# Patient Record
(demographics unavailable — no encounter records)

---

## 2024-11-15 NOTE — HISTORY OF PRESENT ILLNESS
[FreeTextEntry1] : 5/23 right partial mastectomy  right 12:00, calcifications, 10 mm, ER/UT positive, intermediate grade DCIS, negative margins TisNXM0, Stage 0 No XRT; Anastrazole x 1-year; Tamoxifen  Mother with breast cancer at age 70  Patient denies any breast masses or nipple discharge.  Patient will be getting a D&C soon from her gynecologist because of a thickened endometrium.  71-year-old postmenopausal nulliparous woman with a family history of breast cancer presents after screening mammogram showed some upper outer quadrant right breast calcifications at approximately 12:00, BI-RADS 4.  Patient denied any breast masses or nipple discharge.  Patient underwent a right stereotactic core biopsy which revealed intermediate grade ER/UT positive ductal carcinoma in situ.  Patient has never had a breast biopsy and has never had genetic testing although her mother had breast cancer at age 70.  Patient has had a long history of alcohol and narcotic abuse that she no longer does but is a long-term 1 pack/day smoker greater than 20 years.

## 2024-11-15 NOTE — REVIEW OF SYSTEMS
[Negative] : Heme/Lymph [Nasal Discharge] : nasal discharge [Breast Lump] : breast lump [de-identified] : New Moles

## 2024-11-15 NOTE — PAST MEDICAL HISTORY
[Postmenopausal] : The patient is postmenopausal [Menarche Age ____] : age at menarche was [unfilled] [History of Hormone Replacement Treatment] : has no history of hormone replacement treatment [Definite ___ (Date)] : the last menstrual period was [unfilled] [Total Preg ___] : G[unfilled]

## 2025-02-03 NOTE — ASSESSMENT
[FreeTextEntry1] : #DCIS - ER/WV+ - We have reviewed the diagnosis, prognosis and natural history of DCIS. - We have reviewed the imaging and pathology reports personally and discussed the findings with the patient. - We have discussed that she has already had a lumpectomy which is usually followed by radiation given the findings of DCIS. - She does not want to receive radiation. -  Reviewed bone density - osteopenia of hip - Has not started on Anastrozole. Advised that she should - 12/20/23 vs and CBC reviewed; WBC 4.16, hgb 13.1, plt 177. She started Anastrazole overall tolerating but with some changes to mood. Has depression at baseline on Buspirone. +hair thinning. s/p follow up with Dr. Vargas 10/2023, note reviewed. Mammo/sono due 3/2024.  - 4/29/24 vs and CBC reviewed; Continue Anastrazole. Has follow up with dr. Vargas 5/2024 and mammo/sono same day. +hair thinning using rogaine. Advised to avoid anything with synthetic hormones  8/5/24 - vs and labs reviewed. labs drawn in office today. 4.14, hgb 11.8, plts wnl. 5/3/24 - No mammographic or sonographic evidence of malignancy. RECOMMENDATION:  Mammography in 1 year. She is having hotflashes and hair thinning. She wants to switch from anastrozole and change to tamoxifen - Discussed side effects and risk of blood clots franko with her smoking history. recommend ASA 81 mg. follow up with Dr. Vargas   #Macrocytosis resolved  #Monoclonal Gammopathy  - IgG Kappa - M spike - unable to quantify - K:L ratio 2.08 - Pending skeletal survey - 12/20/23 patient has not gone for skeletal survey. Again reminded to schedule this. Repeat MGUS labs today and monitor. No anemia. Ca++ and Cr wnl  - 4/29/24 hgb 12 today. Repeat MGUS labs. Have been stable. Did not go for skeletal survey. Again reminded.  -8/5/24 - skeletal survey neg, M spike unable to quantitate  #Tobacco Abuse  - Advised cessation - She does not want to stop at this time - 12/20/23 she is still smoking and has plan to quit after the holidays. She has nicotine patches and gum at home. Discussed wellness center. Encouraged smoking cessation  - 4/29/24 still smoking encuraged cessation  8/5/24 - encourage cessation  #Osteopenia - s/p DEXA 9/2023 revealing L spine 0.4, L femoral neck -0.7, L hip -1.2 - Continue Ca++ and vit D  - Weight bearing exercises  - Continue to monitor q2y  - Maintain healthy BMI and limit ETOH   #Leukopenia  4.14 - monitor  #Vit D deficiency  - Monitor levels. Repeat today  - Continue vit D   RTC in 6months with cbc with diff,cmp, myeloma labs and myeloma urines vit D

## 2025-02-03 NOTE — HISTORY OF PRESENT ILLNESS
[de-identified] : Ms. Harmon is a 71-year-old postmenopausal nulliparous woman with a family history of breast cancer presents for newly diagnosed DCIS.   Oncological History:   3/2023 s/p screening mammogram  revealing grouping of calcifications in the right breast. The patient will be recalled for further evaluation with additional mammographic views of the right breast. BIRADS 0   3/29/23 s/p diagnostic mammogram revealing suspicious calcifications at the right 12:00 axis. Stereotactic biopsy is recommended. BIRADS 4   23 s/p right stereotactic core biopsy which revealed intermediate grade ER/NM positive ductal carcinoma in situ.    23 s/p R lumpectomy pathology revealing right 12:00, calcifications, 10 mm, ER/NM positive, intermediate grade DCIS, negative margins  Patient has had a long history of alcohol and narcotic abuse that she no longer does but is a long-term 1 pack/day smoker greater than 20 years referred to pul for lung cancer screening.   Breast Cancer Risk Factors: Menarche: 11 Menopause: 52  OCP: years HRT: denies Related family history Mother with breast cancer at age 70,  at 95 not from breast cancer BRCA testing: no     [de-identified] : Patient seen and examined today for routine follow up for the management of DCIS.  complains of hotflashes and hair thinning.  She is on Lexapro.  She is still smoking has not tried nicotine patches. Using Rogaine for hair thinning.  Continues to follow with dr. Vargas and has mammo same day 5/2024.  skeletal survey neg

## 2025-02-20 NOTE — HISTORY OF PRESENT ILLNESS
[de-identified] : Ms. Harmon is a 71-year-old postmenopausal nulliparous woman with a family history of breast cancer presents for newly diagnosed DCIS.   Oncological History:   3/2023 s/p screening mammogram  revealing grouping of calcifications in the right breast. The patient will be recalled for further evaluation with additional mammographic views of the right breast. BIRADS 0   3/29/23 s/p diagnostic mammogram revealing suspicious calcifications at the right 12:00 axis. Stereotactic biopsy is recommended. BIRADS 4   23 s/p right stereotactic core biopsy which revealed intermediate grade ER/NV positive ductal carcinoma in situ.    23 s/p R lumpectomy pathology revealing right 12:00, calcifications, 10 mm, ER/NV positive, intermediate grade DCIS, negative margins  Patient has had a long history of alcohol and narcotic abuse that she no longer does but is a long-term 1 pack/day smoker greater than 20 years referred to pul for lung cancer screening.   Breast Cancer Risk Factors: Menarche: 11 Menopause: 52  OCP: years HRT: denies Related family history Mother with breast cancer at age 70,  at 95 not from breast cancer BRCA testing: no     [de-identified] : Patient seen and examined today for routine follow up for the management of DCIS. Due to hot flashes and hair thinning she was switched to tamoxifen states hot flashes are about the same. She is tolerating and not unbearable right now. Dr. Verma recently started her on bupropion, has interaction with tamoxifen. Still smoking. Did LDCT benign. continues to follow with Dr. Vargas. s/p mammo/sono 5/2024 due 5/2025.

## 2025-02-20 NOTE — ASSESSMENT
[FreeTextEntry1] : #DCIS - ER/CO+ - We have reviewed the diagnosis, prognosis and natural history of DCIS. - We have reviewed the imaging and pathology reports personally and discussed the findings with the patient. - We have discussed that she has already had a lumpectomy which is usually followed by radiation given the findings of DCIS. - She does not want to receive radiation. -  Reviewed bone density - osteopenia of hip - Has not started on Anastrozole. Advised that she should - 12/20/23 vs and CBC reviewed; WBC 4.16, hgb 13.1, plt 177. She started Anastrazole overall tolerating but with some changes to mood. Has depression at baseline on Buspirone. +hair thinning. s/p follow up with Dr. Vargas 10/2023, note reviewed. Mammo/sono due 3/2024.  - 4/29/24 vs and CBC reviewed; Continue Anastrazole. Has follow up with dr. Vargas 5/2024 and mammo/sono same day. +hair thinning using rogaine. Advised to avoid anything with synthetic hormones  8/5/24 - vs and labs reviewed. labs drawn in office today. 4.14, hgb 11.8, plts wnl. 5/3/24 - No mammographic or sonographic evidence of malignancy. RECOMMENDATION:  Mammography in 1 year. She is having hotflashes and hair thinning. She wants to switch from anastrozole and change to tamoxifen - Discussed side effects and risk of blood clots franko with her smoking history. recommend ASA 81 mg. follow up with Dr. Vargas 2/2025 vs and labs reviewed; continue tamoxifen and ASA 81mg. She states that hotflashes are the same they are not unbearable. She is already on lexapro does not want futher medication. Of note i reviewed the interaction with buproprion as discussed with tabitha, she will discuss with Dr. Verma who prescribed. mammo/sono 5/2025   #Monoclonal Gammopathy  - IgG Kappa - M spike - unable to quantify - K:L ratio 2.08 - Pending skeletal survey - 12/20/23 patient has not gone for skeletal survey. Again reminded to schedule this. Repeat MGUS labs today and monitor. No anemia. Ca++ and Cr wnl  - 4/29/24 hgb 12 today. Repeat MGUS labs. Have been stable. Did not go for skeletal survey. Again reminded.  -8/5/24 - skeletal survey neg, M spike unable to quantitate - 2/2025 repeat levels today   #Tobacco Abuse  - Advised cessation - She does not want to stop at this time - 12/20/23 she is still smoking and has plan to quit after the holidays. She has nicotine patches and gum at home. Discussed wellness center. Encouraged smoking cessation  - 4/29/24 still smoking encouraged cessation  8/5/24 - encourage cessation 2/2025 gave info to smoking cessation   #Osteopenia - s/p DEXA 9/2023 revealing L spine 0.4, L femoral neck -0.7, L hip -1.2 - Continue Ca++ and vit D  - Weight bearing exercises  - Continue to monitor q2y due 9/2025  - Maintain healthy BMI and limit ETOH   #Leukopenia  - Monitor   #Vit D deficiency  - Monitor levels. Repeat today  - Continue vit D   #Chronic Lower ext edema  - For many years  - Wears compression stockings, L worse than R, ext warm. No redness +vascular changes, no pain. On baby ASA  - Advised to call if worsening   RTC in 6months with cbc with diff,cmp, myeloma labs and myeloma urines vit D   Case and management dicsussed with Ave Hernandez

## 2025-02-20 NOTE — ASSESSMENT
[FreeTextEntry1] : #DCIS - ER/MI+ - We have reviewed the diagnosis, prognosis and natural history of DCIS. - We have reviewed the imaging and pathology reports personally and discussed the findings with the patient. - We have discussed that she has already had a lumpectomy which is usually followed by radiation given the findings of DCIS. - She does not want to receive radiation. -  Reviewed bone density - osteopenia of hip - Has not started on Anastrozole. Advised that she should - 12/20/23 vs and CBC reviewed; WBC 4.16, hgb 13.1, plt 177. She started Anastrazole overall tolerating but with some changes to mood. Has depression at baseline on Buspirone. +hair thinning. s/p follow up with Dr. Vargas 10/2023, note reviewed. Mammo/sono due 3/2024.  - 4/29/24 vs and CBC reviewed; Continue Anastrazole. Has follow up with dr. Vargas 5/2024 and mammo/sono same day. +hair thinning using rogaine. Advised to avoid anything with synthetic hormones  8/5/24 - vs and labs reviewed. labs drawn in office today. 4.14, hgb 11.8, plts wnl. 5/3/24 - No mammographic or sonographic evidence of malignancy. RECOMMENDATION:  Mammography in 1 year. She is having hotflashes and hair thinning. She wants to switch from anastrozole and change to tamoxifen - Discussed side effects and risk of blood clots franko with her smoking history. recommend ASA 81 mg. follow up with Dr. Vargas 2/2025 vs and labs reviewed; continue tamoxifen and ASA 81mg. She states that hotflashes are the same they are not unbearable. She is already on lexapro does not want futher medication. Of note i reviewed the interaction with buproprion as discussed with tabitha, she will discuss with Dr. Verma who prescribed. mammo/sono 5/2025   #Monoclonal Gammopathy  - IgG Kappa - M spike - unable to quantify - K:L ratio 2.08 - Pending skeletal survey - 12/20/23 patient has not gone for skeletal survey. Again reminded to schedule this. Repeat MGUS labs today and monitor. No anemia. Ca++ and Cr wnl  - 4/29/24 hgb 12 today. Repeat MGUS labs. Have been stable. Did not go for skeletal survey. Again reminded.  -8/5/24 - skeletal survey neg, M spike unable to quantitate - 2/2025 repeat levels today   #Tobacco Abuse  - Advised cessation - She does not want to stop at this time - 12/20/23 she is still smoking and has plan to quit after the holidays. She has nicotine patches and gum at home. Discussed wellness center. Encouraged smoking cessation  - 4/29/24 still smoking encouraged cessation  8/5/24 - encourage cessation 2/2025 gave info to smoking cessation   #Osteopenia - s/p DEXA 9/2023 revealing L spine 0.4, L femoral neck -0.7, L hip -1.2 - Continue Ca++ and vit D  - Weight bearing exercises  - Continue to monitor q2y due 9/2025  - Maintain healthy BMI and limit ETOH   #Leukopenia  - Monitor   #Vit D deficiency  - Monitor levels. Repeat today  - Continue vit D   #Chronic Lower ext edema  - For many years  - Wears compression stockings, L worse than R, ext warm. No redness +vascular changes, no pain. On baby ASA  - Advised to call if worsening   RTC in 6months with cbc with diff,cmp, myeloma labs and myeloma urines vit D   Case and management dicsussed with Ave Hernandez

## 2025-02-20 NOTE — HISTORY OF PRESENT ILLNESS
[de-identified] : Ms. Harmon is a 71-year-old postmenopausal nulliparous woman with a family history of breast cancer presents for newly diagnosed DCIS.   Oncological History:   3/2023 s/p screening mammogram  revealing grouping of calcifications in the right breast. The patient will be recalled for further evaluation with additional mammographic views of the right breast. BIRADS 0   3/29/23 s/p diagnostic mammogram revealing suspicious calcifications at the right 12:00 axis. Stereotactic biopsy is recommended. BIRADS 4   23 s/p right stereotactic core biopsy which revealed intermediate grade ER/OH positive ductal carcinoma in situ.    23 s/p R lumpectomy pathology revealing right 12:00, calcifications, 10 mm, ER/OH positive, intermediate grade DCIS, negative margins  Patient has had a long history of alcohol and narcotic abuse that she no longer does but is a long-term 1 pack/day smoker greater than 20 years referred to pul for lung cancer screening.   Breast Cancer Risk Factors: Menarche: 11 Menopause: 52  OCP: years HRT: denies Related family history Mother with breast cancer at age 70,  at 95 not from breast cancer BRCA testing: no     [de-identified] : Patient seen and examined today for routine follow up for the management of DCIS. Due to hot flashes and hair thinning she was switched to tamoxifen states hot flashes are about the same. She is tolerating and not unbearable right now. Dr. Verma recently started her on bupropion, has interaction with tamoxifen. Still smoking. Did LDCT benign. continues to follow with Dr. Vargas. s/p mammo/sono 5/2024 due 5/2025.

## 2025-05-15 NOTE — ASSESSMENT
[FreeTextEntry1] : Will plan on an ERCP for bile duct stone.  Explained risk/benefits/alternatives including not limited to bleeding, infection, perforation, missed lesions, anesthesia complications, pancreatitis.  Patient understands and agrees, all questions answered.  Will need a colonoscopy for screening, will schedule some time after her ERCP. Thank you for referring Ms. JIN.  Please do not hesitate to call to further discuss his/her care.

## 2025-05-15 NOTE — HISTORY OF PRESENT ILLNESS
[FreeTextEntry1] : Ms. James is a 73F h/o R partial mastectomy 5/23, CAD, ayan, HTN, anxiety, osteoarthritis, HCV s/p treatment with cure, current smoker who is referred by Dr. Verma for choledocholithiasis. She was c/o intermittent R flank pain several months ago, although she states she was not having overt abd pain. Came and went, although resolved well before she went for a CT scan. Eventually underwent a CT A/P on 4/18/25: 4 mm calculus in the distal CBD with dilatation of the CBD up to 11 mm. s/p cholecystectomy Never had jaundice, icterus, dark urine, pale stool. Normal liver enzymes, last on 3/24/25: TB 0.3 AST/ALT 20/14 Alk phos 53 No weight change. Otherwise feels well. Last colonoscopy was around 15 years ago. Normal brown BM daily, no blood, no black stool. No heartburn, regurgitation. No FHx of any GI malignancies. Current daily smoker, does not drink.

## 2025-06-27 NOTE — HISTORY OF PRESENT ILLNESS
[FreeTextEntry1] : 3-month follow-up of chronic medical conditions, prescription refills. [de-identified] : Patient presents for follow-up of chronic medical conditions-s/p right partial mastectomy-05/23/20236196-GRHY-LC/RI+, CAD, hypertension, generalized anxiety disorder, osteoarthritis, allergic rhinitis, low vitamin D-and prescription refills. Breast surgery was tolerated well and had no complications post-procedure. Presently taking anastrozole 1 mg/day. No RT was performed. In addition, reports that Myrbetriq 25 mg/day continues to control bladder symptoms well. Evaluated by vascular surgeon for chronic venous insufficiency and undergoing treatments. Also, reports major depressive episode symptoms reasonably well controlled with escitalopram 20 mg/day and bupropion  mg/day. However, has been advised by oncology that bupropion decreases efficacy of tamoxifen and instructed patient to discontinue. Also, reporting that vascular surgical procedures to date for control of venous insufficiency have been suboptimal. Otherwise, feels well in general. No new symptoms. Tolerating all prescription medications. Symptoms well controlled on present medication regimen. Reports has reduced smoking to 1-2 cigarettes/day and plans to stop completely within the next few months.

## 2025-06-27 NOTE — PHYSICAL EXAM
[No Acute Distress] : no acute distress [Well Nourished] : well nourished [Well Developed] : well developed [Well-Appearing] : well-appearing [Normal Voice/Communication] : normal voice/communication [Normal Sclera/Conjunctiva] : normal sclera/conjunctiva [PERRL] : pupils equal round and reactive to light [EOMI] : extraocular movements intact [No JVD] : no jugular venous distention [Supple] : supple [No Respiratory Distress] : no respiratory distress  [Clear to Auscultation] : lungs were clear to auscultation bilaterally [Normal Rate] : normal rate  [Regular Rhythm] : with a regular rhythm [Normal S1, S2] : normal S1 and S2 [No Carotid Bruits] : no carotid bruits [Pedal Pulses Present] : the pedal pulses are present [No Edema] : there was no peripheral edema [Soft] : abdomen soft [Non Tender] : non-tender [Non-distended] : non-distended [No Masses] : no abdominal mass palpated [No HSM] : no HSM [Normal Bowel Sounds] : normal bowel sounds [Normal Axillary Nodes] : no axillary lymphadenopathy [Normal Posterior Cervical Nodes] : no posterior cervical lymphadenopathy [Normal Anterior Cervical Nodes] : no anterior cervical lymphadenopathy [Normal Inguinal Nodes] : no inguinal lymphadenopathy [No CVA Tenderness] : no CVA  tenderness [No Spinal Tenderness] : no spinal tenderness [No Rash] : no rash [Coordination Grossly Intact] : coordination grossly intact [No Focal Deficits] : no focal deficits [Normal Gait] : normal gait [Normal Affect] : the affect was normal [Alert and Oriented x3] : oriented to person, place, and time [Normal Insight/Judgement] : insight and judgment were intact [de-identified] : 1+ bilateral pedal edema

## 2025-06-27 NOTE — REVIEW OF SYSTEMS
[Recent Change In Weight] : ~T recent weight change [Lower Ext Edema] : lower extremity edema [Back Pain] : back pain [Negative] : Heme/Lymph [Chest Pain] : no chest pain [Palpitations] : no palpitations [Leg Claudication] : no leg claudication [Orthopnea] : no orthopnea [Paroxysmal Nocturnal Dyspnea] : no paroxysmal nocturnal dyspnea [Shortness Of Breath] : no shortness of breath [Wheezing] : no wheezing [Cough] : no cough [Dyspnea on Exertion] : no dyspnea on exertion [Abdominal Pain] : no abdominal pain [Joint Pain] : no joint pain [Joint Stiffness] : no joint stiffness [Joint Swelling] : no joint swelling [Skin Rash] : no skin rash [Swollen Glands] : no swollen glands [FreeTextEntry2] : Decreased weight-3 lbs

## 2025-06-27 NOTE — HISTORY OF PRESENT ILLNESS
[FreeTextEntry1] : 3-month follow-up of chronic medical conditions, prescription refills. [de-identified] : Patient presents for follow-up of chronic medical conditions-s/p right partial mastectomy-05/23/[id number]-TZ/SC+, CAD, hypertension, generalized anxiety disorder, osteoarthritis, allergic rhinitis, low vitamin D-and prescription refills. Breast surgery was tolerated well and had no complications post-procedure. Presently taking anastrozole 1 mg/day. No RT was performed. In addition, reports that Myrbetriq 25 mg/day continues to control bladder symptoms well. Evaluated by vascular surgeon for chronic venous insufficiency and undergoing treatments. Also, reports major depressive episode symptoms reasonably well controlled with escitalopram 20 mg/day and bupropion  mg/day. However, has been advised by oncology that bupropion decreases efficacy of tamoxifen and instructed patient to discontinue. Also, reporting that vascular surgical procedures to date for control of venous insufficiency have been suboptimal. Otherwise, feels well in general. No new symptoms. Tolerating all prescription medications. Symptoms well controlled on present medication regimen. Reports has reduced smoking to 1-2 cigarettes/day and plans to stop completely within the next few months.

## 2025-06-27 NOTE — COUNSELING
[Cessation strategies including cessation program discussed] : Cessation strategies including cessation program discussed [Yes] : Willing to quit smoking [FreeTextEntry1] : 7

## 2025-06-27 NOTE — PHYSICAL EXAM
[No Acute Distress] : no acute distress [Well Nourished] : well nourished [Well Developed] : well developed [Well-Appearing] : well-appearing [Normal Voice/Communication] : normal voice/communication [Normal Sclera/Conjunctiva] : normal sclera/conjunctiva [PERRL] : pupils equal round and reactive to light [EOMI] : extraocular movements intact [No JVD] : no jugular venous distention [Supple] : supple [No Respiratory Distress] : no respiratory distress  [Clear to Auscultation] : lungs were clear to auscultation bilaterally [Normal Rate] : normal rate  [Regular Rhythm] : with a regular rhythm [Normal S1, S2] : normal S1 and S2 [No Carotid Bruits] : no carotid bruits [Pedal Pulses Present] : the pedal pulses are present [No Edema] : there was no peripheral edema [Soft] : abdomen soft [Non Tender] : non-tender [Non-distended] : non-distended [No Masses] : no abdominal mass palpated [No HSM] : no HSM [Normal Bowel Sounds] : normal bowel sounds [Normal Axillary Nodes] : no axillary lymphadenopathy [Normal Posterior Cervical Nodes] : no posterior cervical lymphadenopathy [Normal Anterior Cervical Nodes] : no anterior cervical lymphadenopathy [Normal Inguinal Nodes] : no inguinal lymphadenopathy [No CVA Tenderness] : no CVA  tenderness [No Spinal Tenderness] : no spinal tenderness [No Rash] : no rash [Coordination Grossly Intact] : coordination grossly intact [No Focal Deficits] : no focal deficits [Normal Gait] : normal gait [Normal Affect] : the affect was normal [Alert and Oriented x3] : oriented to person, place, and time [Normal Insight/Judgement] : insight and judgment were intact [de-identified] : 1+ bilateral pedal edema

## 2025-06-27 NOTE — PLAN
[FreeTextEntry1] : 1)-Treatment plan as outlined above.  2)-No other prescription refills needed at this time. 3)-Unable to obtain venous access due to recent multiple sticks several days ago.

## 2025-07-28 NOTE — HISTORY OF PRESENT ILLNESS
[FreeTextEntry1] : 5/23 right partial mastectomy  right 12:00, calcifications, 10 mm, ER/NJ positive, intermediate grade DCIS, negative margins TisNXM0, Stage 0 No XRT; Anastrazole x 1-year; Tamoxifen  Mother with breast cancer at age 70  Patient denies any breast masses or nipple discharge.  Last May she had a mammogram and ultrasound that is unremarkable, BI-RADS 2. Patient had intrahepatic gallstones that had to be treated recently without pancreatitis.  Patient did not need her D&C after all.  71-year-old postmenopausal nulliparous woman with a family history of breast cancer presents after screening mammogram showed some upper outer quadrant right breast calcifications at approximately 12:00, BI-RADS 4.  Patient denied any breast masses or nipple discharge.  Patient underwent a right stereotactic core biopsy which revealed intermediate grade ER/NJ positive ductal carcinoma in situ.  Patient has never had a breast biopsy and has never had genetic testing although her mother had breast cancer at age 70.  Patient has had a long history of alcohol and narcotic abuse that she no longer does but is a long-term 1 pack/day smoker greater than 20 years.

## 2025-07-28 NOTE — PHYSICAL EXAM
[No Supraclavicular Adenopathy] : no supraclavicular adenopathy [No Cervical Adenopathy] : no cervical adenopathy [Clear to Auscultation Bilat] : clear to auscultation bilaterally [Examined in the supine and seated position] : examined in the supine and seated position [Symmetrical] : symmetrical [No dominant masses] : no dominant masses in right breast  [No dominant masses] : no dominant masses left breast [No Nipple Retraction] : no left nipple retraction [No Nipple Discharge] : no left nipple discharge [No Axillary Lymphadenopathy] : no left axillary lymphadenopathy [No Rashes] : no rashes ABDOMINAL PAIN/NAUSEA

## 2025-07-28 NOTE — REVIEW OF SYSTEMS
[Nasal Discharge] : nasal discharge [Breast Lump] : breast lump [Negative] : Heme/Lymph [de-identified] : New Moles